# Patient Record
Sex: FEMALE | Race: WHITE | NOT HISPANIC OR LATINO | Employment: FULL TIME | URBAN - METROPOLITAN AREA
[De-identification: names, ages, dates, MRNs, and addresses within clinical notes are randomized per-mention and may not be internally consistent; named-entity substitution may affect disease eponyms.]

---

## 2017-05-30 ENCOUNTER — OFFICE VISIT (OUTPATIENT)
Dept: MEDICAL GROUP | Facility: PHYSICIAN GROUP | Age: 67
End: 2017-05-30
Payer: COMMERCIAL

## 2017-05-30 VITALS
WEIGHT: 154.1 LBS | TEMPERATURE: 99.1 F | HEIGHT: 65 IN | DIASTOLIC BLOOD PRESSURE: 70 MMHG | HEART RATE: 74 BPM | SYSTOLIC BLOOD PRESSURE: 118 MMHG | OXYGEN SATURATION: 94 % | RESPIRATION RATE: 16 BRPM | BODY MASS INDEX: 25.67 KG/M2

## 2017-05-30 DIAGNOSIS — E66.3 OVERWEIGHT (BMI 25.0-29.9): ICD-10-CM

## 2017-05-30 DIAGNOSIS — J32.4 CHRONIC PANSINUSITIS: ICD-10-CM

## 2017-05-30 DIAGNOSIS — N94.10 DYSPAREUNIA IN FEMALE: ICD-10-CM

## 2017-05-30 PROCEDURE — 99215 OFFICE O/P EST HI 40 MIN: CPT | Performed by: FAMILY MEDICINE

## 2017-05-30 RX ORDER — AMOXICILLIN AND CLAVULANATE POTASSIUM 875; 125 MG/1; MG/1
1 TABLET, FILM COATED ORAL 2 TIMES DAILY
Qty: 20 TAB | Refills: 0 | Status: SHIPPED | OUTPATIENT
Start: 2017-05-30 | End: 2017-07-31

## 2017-05-30 ASSESSMENT — PATIENT HEALTH QUESTIONNAIRE - PHQ9: CLINICAL INTERPRETATION OF PHQ2 SCORE: 2

## 2017-05-30 NOTE — ASSESSMENT & PLAN NOTE
Previously was on estradiol after her hysterectomy. She had an abnormal mammogram and it was recommended that she stop HRT. She does have pain with intercourse and low libido.

## 2017-05-30 NOTE — PATIENT INSTRUCTIONS

## 2017-05-30 NOTE — PROGRESS NOTES
"Shira Drake is a 66 y.o. female here to establish care and discuss weight concerns and sinus problems.    HPI:  Shira is a pleasant 66 of the muscular establish care. Her previous PCP was at [facility]. 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    Dyspareunia in female  Previously was on estradiol after her hysterectomy. She had an abnormal mammogram and it was recommended that she stop HRT. She does have pain with intercourse and low libido.    Overweight (BMI 25.0-29.9)  We discussed patient's weight today. BMI is 26. She tells me, \"I'm upset about my weight.\" She mentions this is the highest she has been.  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  Exercise: Seeing a  at her local gym. She does feel like the altitude is affecting her. She enjoys doing step aerobics, but hasn't been able to find any local classes.    Chronic pansinusitis  Patient has had sinus pressure and pain, blocked ears, lots of drainage for the last 8 months. She has been treated with azithromycin which has helped, but her symptoms return. No fever or chills.    Current medicines (including changes today)  Current Outpatient Prescriptions   Medication Sig Dispense Refill   • amoxicillin-clavulanate (AUGMENTIN) 875-125 MG Tab Take 1 Tab by mouth 2 times a day. 20 Tab 0     No current facility-administered medications for this visit.     She  has no past medical history on file.  She  has past surgical history that includes abdominal hysterectomy total; primary c section; and lumpectomy.  Social History   Substance Use Topics   • Smoking status: Never Smoker    • Smokeless tobacco: Never Used   • Alcohol Use: Yes      Comment: Social     Social History     Social History Narrative   • No narrative on file     Family History   Problem Relation Age of Onset   • Heart Disease Mother      Afib, CHF   • Hypertension Mother    • Cancer Father      Lung   • Hypertension Father    • Cancer Maternal Grandmother      " "Colon cancer   • Cancer Maternal Grandfather    • Alcohol/Drug Maternal Grandfather    • Cancer Paternal Grandmother      Breast cancer   • Heart Disease Paternal Grandfather    • No Known Problems Daughter    • No Known Problems Daughter    • No Known Problems Son    • No Known Problems Son      Family Status   Relation Status Death Age   • Mother     • Father     • Maternal Grandmother     • Maternal Grandfather     • Paternal Grandmother     • Paternal Grandfather     • Daughter Alive    • Daughter Alive    • Son Alive    • Son Alive      ROS  Constitutional: Negative for fever, chills and malaise/fatigue.   HENT: See HPI.   Eyes: Negative for pain.   Respiratory: Negative for cough and shortness of breath.    Cardiovascular: Negative for leg swelling.   Gastrointestinal: Negative for nausea, vomiting, abdominal pain and diarrhea.   Genitourinary: Negative for dysuria and hematuria.   Skin: Negative for rash.   Neurological: Negative for dizziness, focal weakness and headaches.   Endo/Heme/Allergies: Does not bruise/bleed easily.   Psychiatric/Behavioral: Negative for depression.  The patient is not nervous/anxious.       Objective:     Physical Exam:  Blood pressure 118/70, pulse 74, temperature 37.3 °C (99.1 °F), resp. rate 16, height 1.638 m (5' 4.5\"), weight 69.9 kg (154 lb 1.6 oz), SpO2 94 %. Body mass index is 26.05 kg/(m^2).  Constitutional: Alert, no distress.  Skin: Warm, dry, good turgor, no rashes in visible areas.  Eye: Equal, round and reactive, conjunctiva clear, lids normal.  ENMT: Sinuses non-tender. TM's clear bilaterally, lips without lesions, good dentition, oropharynx clear.  Neck: Trachea midline, no masses, no thyromegaly. No cervical or supraclavicular lymphadenopathy.  Respiratory: Unlabored respiratory effort, lungs clear to auscultation, no wheezes, no ronchi.  Cardiovascular: Normal S1, S2, no murmur, no edema.  Abdomen: Soft, non-tender, " no masses, no hepatosplenomegaly.  Psych: Alert and oriented x3, normal affect and mood.    Assessment and Plan:     1. Dyspareunia in female  This is a new problem for the patient. She was on hormone replacement therapy in the past, but this was discontinued after she had some abnormal mammograms and also for concern of cancer. She did not have any improvement with vaginal estrogen cream and does not want to retry this today. Discussed the use of oil based lubricants prior to intercourse. We'll continue to monitor.    2. Chronic pansinusitis  This is a recurring issue for the patient. Discussed looking at the sinuses with a CT scan versus trial of a higher dose antibiotic. Patient would like to try another course of antibiotics at this time. Will follow up in 6-8 weeks for reassessment. If not improved, will order CT scan.  - amoxicillin-clavulanate (AUGMENTIN) 875-125 MG Tab; Take 1 Tab by mouth 2 times a day.  Dispense: 20 Tab; Refill: 0    3. Overweight (BMI 25.0-29.9)  Patient's weight was discussed today, including healthy low-carb diet, 30-minutes of moderate exercise daily and avoiding sugars and high-fat foods. Query psychosocial component and mild depression. Continue to monitor.    Total of 50 minutes face-to-face time spent with patient, with greater than 50% of the total time discussing patient's issues and symptoms as listed above in assessment and plan, as well as managing coordination of care for future evaluation and treatment.    Records requested from previous PCP.  Followup: Return in about 2 months (around 7/30/2017) for f/u sinus and weight concerns, short.         PLEASE NOTE: This dictation was created using voice recognition software. I have made every reasonable attempt to correct obvious errors, but I expect that there are errors of grammar and possibly content that I did not discover before finalizing the note.

## 2017-05-30 NOTE — MR AVS SNAPSHOT
"        Shira LI Vidal   2017 1:00 PM   Office Visit   MRN: [id number]    Department:  [facility]   Dept Phone:  [phone]    Description:  Female : 1950   Provider:  Soraya Alvarado M.D.           Reason for Visit     Nutrition Counseling weight gain    Sinus Problem sinus pain, blocked ears, drainage, [id number]       Allergies as of 2017     Allergen Noted Reactions    Ciprofloxacin Hcl 2016   Rash    rash      You were diagnosed with     Dyspareunia in female   [4703779]       Chronic pansinusitis   [723483]       Overweight (BMI 25.0-29.9)   [630369]         Vital Signs     Blood Pressure Pulse Temperature Respirations Height Weight    118/70 mmHg 74 37.3 °C (99.1 °F) 16 1.638 m (5' 4.5\") 69.9 kg (154 lb 1.6 oz)    Body Mass Index Oxygen Saturation Smoking Status             26.05 kg/m2 94% Never Smoker          Basic Information     Date Of Birth Sex Race Ethnicity Preferred Language    1950 Female White Non- English      Your appointments     2017  1:00 PM   Established Patient with Soraya Alvarado M.D.   [facility] (--)    [address]  [address]  [place] [address] [phone]           You will be receiving a confirmation call a few days before your appointment from our automated call confirmation system.              Problem List              ICD-10-CM Priority Class Noted - Resolved    Dyspareunia in female N94.10   2017 - Present    Overweight (BMI 25.0-29.9) E66.3   2017 - Present    Chronic pansinusitis J32.4   2017 - Present      Health Maintenance        Date Due Completion Dates    IMM DTaP/Tdap/Td Vaccine (1 - Tdap) 6/3/1971 ---    MAMMOGRAM 6/3/1992 ---    COLONOSCOPY 6/3/2002 ---    IMM ZOSTER VACCINE 6/3/2012 ---            Current Immunizations     13-VALENT PCV PREVNAR 2016    Influenza TIV (IM) 2016, 10/7/2015      Below and/or attached are the medications your provider expects you to take. Review all " of your home medications and newly ordered medications with your provider and/or pharmacist. Follow medication instructions as directed by your provider and/or pharmacist. Please keep your medication list with you and share with your provider. Update the information when medications are discontinued, doses are changed, or new medications (including over-the-counter products) are added; and carry medication information at all times in the event of emergency situations     Allergies:  CIPROFLOXACIN HCL - Rash               Medications  Valid as of: May 30, 2017 -  1:43 PM    Generic Name Brand Name Tablet Size Instructions for use    Amoxicillin-Pot Clavulanate (Tab) AUGMENTIN 875-125 MG Take 1 Tab by mouth 2 times a day.        .                 Medicines prescribed today were sent to:     [facility][id number] - [place] - [address]    [address] [place] [address]    Phone: [phone] Fax: [phone]    Open 24 Hours?: No      Medication refill instructions:       If your prescription bottle indicates you have medication refills left, it is not necessary to call your provider’s office. Please contact your pharmacy and they will refill your medication.    If your prescription bottle indicates you do not have any refills left, you may request refills at any time through one of the following ways: The online [employer] system (except Urgent Care), by calling your provider’s office, or by asking your pharmacy to contact your provider’s office with a refill request. Medication refills are processed only during regular business hours and may not be available until the next business day. Your provider may request additional information or to have a follow-up visit with you prior to refilling your medication.   *Please Note: Medication refills are assigned a new Rx number when refilled electronically. Your pharmacy may indicate that no refills were authorized even though a new prescription for the same medication is  available at the pharmacy. Please request the medicine by name with the pharmacy before contacting your provider for a refill.        Instructions    Sinusitis, Adult  Sinusitis is redness, soreness, and inflammation of the paranasal sinuses. Paranasal sinuses are air pockets within the bones of your face. They are located beneath your eyes, in the middle of your forehead, and above your eyes. In healthy paranasal sinuses, mucus is able to drain out, and air is able to circulate through them by way of your nose. However, when your paranasal sinuses are inflamed, mucus and air can become trapped. This can allow bacteria and other germs to grow and cause infection.  Sinusitis can develop quickly and last only a short time (acute) or continue over a long period (chronic). Sinusitis that lasts for more than 12 weeks is considered chronic.  CAUSES  Causes of sinusitis include:  · Allergies.  · Structural abnormalities, such as displacement of the cartilage that separates your nostrils (deviated septum), which can decrease the air flow through your nose and sinuses and affect sinus drainage.  · Functional abnormalities, such as when the small hairs (cilia) that line your sinuses and help remove mucus do not work properly or are not present.  SIGNS AND SYMPTOMS  Symptoms of acute and chronic sinusitis are the same. The primary symptoms are pain and pressure around the affected sinuses. Other symptoms include:  · Upper toothache.  · Earache.  · Headache.  · Bad breath.  · Decreased sense of smell and taste.  · A cough, which worsens when you are lying flat.  · Fatigue.  · Fever.  · Thick drainage from your nose, which often is green and may contain pus (purulent).  · Swelling and warmth over the affected sinuses.  DIAGNOSIS  Your health care provider will perform a physical exam. During your exam, your health care provider may perform any of the following to help determine if you have acute sinusitis or chronic  sinusitis:  · Look in your nose for signs of abnormal growths in your nostrils (nasal polyps).  · Tap over the affected sinus to check for signs of infection.  · View the inside of your sinuses using an imaging device that has a light attached (endoscope).  If your health care provider suspects that you have chronic sinusitis, one or more of the following tests may be recommended:  · Allergy tests.  · Nasal culture. A sample of mucus is taken from your nose, sent to a lab, and screened for bacteria.  · Nasal cytology. A sample of mucus is taken from your nose and examined by your health care provider to determine if your sinusitis is related to an allergy.  TREATMENT  Most cases of acute sinusitis are related to a viral infection and will resolve on their own within 10 days. Sometimes, medicines are prescribed to help relieve symptoms of both acute and chronic sinusitis. These may include pain medicines, decongestants, nasal steroid sprays, or saline sprays.  However, for sinusitis related to a bacterial infection, your health care provider will prescribe antibiotic medicines. These are medicines that will help kill the bacteria causing the infection.  Rarely, sinusitis is caused by a fungal infection. In these cases, your health care provider will prescribe antifungal medicine.  For some cases of chronic sinusitis, surgery is needed. Generally, these are cases in which sinusitis recurs more than 3 times per year, despite other treatments.  HOME CARE INSTRUCTIONS  · Drink plenty of water. Water helps thin the mucus so your sinuses can drain more easily.  · Use a humidifier.  · Inhale steam 3-4 times a day (for example, sit in the bathroom with the shower running).  · Apply a warm, moist washcloth to your face 3-4 times a day, or as directed by your health care provider.  · Use saline nasal sprays to help moisten and clean your sinuses.  · Take medicines only as directed by your health care provider.  · If you were  prescribed either an antibiotic or antifungal medicine, finish it all even if you start to feel better.  SEEK IMMEDIATE MEDICAL CARE IF:  · You have increasing pain or severe headaches.  · You have nausea, vomiting, or drowsiness.  · You have swelling around your face.  · You have vision problems.  · You have a stiff neck.  · You have difficulty breathing.     This information is not intended to replace advice given to you by your health care provider. Make sure you discuss any questions you have with your health care provider.     Document Released: 12/18/2006 Document Revised: 01/08/2016 Document Reviewed: 01/01/2013  Let it [employer] Interactive Patient Education ©2016 Elsevier Inc.            AfterYeshart Access Code: Activation code not generated  Current H[employer]Art Status: Active

## 2017-05-30 NOTE — Clinical Note
[facility]  Soraya Alvarado M.D.  [address]  [place] [address]  Fax: [phone]   Authorization for Release/Disclosure of   Protected Health Information   Name: SHIRA DRAKE : 1950 SSN: [id number]   Address: [address] [place] [address]  [place] [address] Phone:    [phone] (home)    I authorize the entity listed below to release/disclose the PHI below to:   [facility]/Soraya Alvarado M.D. and Soraya Alvarado M.D.   Provider or Entity Name:  [facility], [place], [place]  Phone:      Fax:     Reason for request: continuity of care   Information to be released:    [  ] LAST COLONOSCOPY,  including any PATH REPORT and follow-up  [  ] LAST FIT/COLOGUARD RESULT [  ] LAST DEXA  [  ] LAST MAMMOGRAM  [  ] LAST PAP  [  ] LAST LABS [  ] RETINA EXAM REPORT  [  ] IMMUNIZATION RECORDS  [  ] Release all info      [  ] Check here and initial the line next to each item to release ALL health information INCLUDING  _____ Care and treatment for drug and / or alcohol abuse  _____ HIV testing, infection status, or AIDS  _____ Genetic Testing    DATES OF SERVICE OR TIME PERIOD TO BE DISCLOSED: _____________  I understand and acknowledge that:  * This Authorization may be revoked at any time by you in writing, except if your health information has already been used or disclosed.  * Your health information that will be used or disclosed as a result of you signing this authorization could be re-disclosed by the recipient. If this occurs, your re-disclosed health information may no longer be protected by State or Federal laws.  * You may refuse to sign this Authorization. Your refusal will not affect your ability to obtain treatment.  * This Authorization becomes effective upon signing and will  on (date) __________.      If no date is indicated, this Authorization will  one (1) year from the signature date.    Name: Shira Drake    Signature:   Date:     2017       PLEASE FAX  REQUESTED RECORDS BACK TO: [phone]

## 2017-05-30 NOTE — ASSESSMENT & PLAN NOTE
"We discussed patient's weight today. BMI is 26. She tells me, \"I'm upset about my weight.\" She mentions this is the highest she has been.  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  Exercise: Seeing a  at her local gym. She does feel like the altitude is affecting her. She enjoys doing step aerobics, but hasn't been able to find any local classes.  "

## 2017-07-31 ENCOUNTER — TELEPHONE (OUTPATIENT)
Dept: MEDICAL GROUP | Facility: PHYSICIAN GROUP | Age: 67
End: 2017-07-31

## 2017-07-31 ENCOUNTER — OFFICE VISIT (OUTPATIENT)
Dept: MEDICAL GROUP | Facility: PHYSICIAN GROUP | Age: 67
End: 2017-07-31
Payer: COMMERCIAL

## 2017-07-31 VITALS
OXYGEN SATURATION: 94 % | BODY MASS INDEX: 26.27 KG/M2 | SYSTOLIC BLOOD PRESSURE: 108 MMHG | DIASTOLIC BLOOD PRESSURE: 78 MMHG | WEIGHT: 153.88 LBS | RESPIRATION RATE: 16 BRPM | HEART RATE: 72 BPM | HEIGHT: 64 IN | TEMPERATURE: 99 F

## 2017-07-31 DIAGNOSIS — Z13.220 SCREENING FOR LIPID DISORDERS: ICD-10-CM

## 2017-07-31 DIAGNOSIS — L60.8 NAIL DEFORMITY: ICD-10-CM

## 2017-07-31 DIAGNOSIS — E66.3 OVERWEIGHT (BMI 25.0-29.9): ICD-10-CM

## 2017-07-31 DIAGNOSIS — N94.10 DYSPAREUNIA IN FEMALE: ICD-10-CM

## 2017-07-31 DIAGNOSIS — R21 RASH: ICD-10-CM

## 2017-07-31 DIAGNOSIS — J32.4 CHRONIC PANSINUSITIS: ICD-10-CM

## 2017-07-31 DIAGNOSIS — Z13.1 SCREENING FOR DIABETES MELLITUS: ICD-10-CM

## 2017-07-31 DIAGNOSIS — Z78.0 POSTMENOPAUSAL: ICD-10-CM

## 2017-07-31 PROBLEM — B35.1 ONYCHOMYCOSIS: Status: ACTIVE | Noted: 2017-07-31

## 2017-07-31 PROCEDURE — 99214 OFFICE O/P EST MOD 30 MIN: CPT | Performed by: FAMILY MEDICINE

## 2017-07-31 NOTE — ASSESSMENT & PLAN NOTE
This is a new problem. She has noticed that her nail is  from the nail bed. Patient was told by her manicurist that she had fungus on her nails. She has her nails done every 2-3 weeks with gel polish.

## 2017-07-31 NOTE — Clinical Note
[facility]  Soraya Alvarado M.D.  [address]  [place] [address]  Fax: [phone]   Authorization for Release/Disclosure of   Protected Health Information   Name: SHIRA LI DRAKE : 1950 SSN: [id number]   Address: [address] [place] [address]  [place] [address] Phone:    [phone] (home)    I authorize the entity listed below to release/disclose the PHI below to:   [facility]/Soraya Alvarado M.D. and Soraya Alvarado M.D.   Provider or Entity Name:  [facility], [place], [place]  Phone:      Fax:  [phone]   Reason for request: continuity of care   Information to be released:    [  ] LAST COLONOSCOPY,  including any PATH REPORT and follow-up  [  ] LAST FIT/COLOGUARD RESULT [  ] LAST DEXA  [  ] LAST MAMMOGRAM  [  ] LAST PAP  [  ] LAST LABS [  ] RETINA EXAM REPORT  [X  ] IMMUNIZATION RECORDS  [X  ] Release all info      [  ] Check here and initial the line next to each item to release ALL health information INCLUDING  _____ Care and treatment for drug and / or alcohol abuse  _____ HIV testing, infection status, or AIDS  _____ Genetic Testing    DATES OF SERVICE OR TIME PERIOD TO BE DISCLOSED: _____________  I understand and acknowledge that:  * This Authorization may be revoked at any time by you in writing, except if your health information has already been used or disclosed.  * Your health information that will be used or disclosed as a result of you signing this authorization could be re-disclosed by the recipient. If this occurs, your re-disclosed health information may no longer be protected by State or Federal laws.  * You may refuse to sign this Authorization. Your refusal will not affect your ability to obtain treatment.  * This Authorization becomes effective upon signing and will  on (date) __________.      If no date is indicated, this Authorization will  one (1) year from the signature date.    Name: Shira Drake    Signature:   Date:     2017       PLEASE  FAX REQUESTED RECORDS BACK TO: [phone]

## 2017-07-31 NOTE — ASSESSMENT & PLAN NOTE
This is a new problem. Starting last year, she noticed a rash on the backs of her lower legs. It has seemed to spread to the front of her legs. It is not itchy. No drainage.     Worsens when she wears long pants on a hot day.

## 2017-07-31 NOTE — TELEPHONE ENCOUNTER
Patient is requesting additional labs to be ordered.  States she would like complete panel of labs as she had not had these done since she was at [facility].  To  to advise.  Patient aware she will get a [employer] message once this has been done.

## 2017-07-31 NOTE — PROGRESS NOTES
"Subjective:   Shira Drake is a 67 y.o. female here today for follow-up sinus infection, new rash and nail concerns.  Chronic pansinusitis  Since her last appointment, patient reports that her sinus pressure, pain and nasal congestion symptoms have not changed. Now associated with headaches.    She was treated with antibiotics for sinus infections in December 2016 and again in May 2017.    Continues to use Flonase without improvement. We discussed getting a CT scan, but patient is hesitant due to cost.    Rash  This is a new problem. Starting last year, she noticed a rash on the backs of her lower legs. It has seemed to spread to the front of her legs. It is not itchy. No drainage.     Worsens when she wears long pants on a hot day.    Nail deformity  This is a new problem. She has noticed that her nail is  from the nail bed. Patient was told by her manicurist that she had fungus on her nails. She has her nails done every 2-3 weeks with gel polish.    Dyspareunia in female  No change. We have not yet received records from her previous physician regarding her abnormal mammogram.     Current medicines (including changes today)  No current outpatient prescriptions on file.     No current facility-administered medications for this visit.     She  has no past medical history on file.    ROS   See HPI. No chest pain, no shortness of breath, no abdominal pain.     Objective:     Physical Exam:  Blood pressure 108/78, pulse 72, temperature 37.2 °C (99 °F), resp. rate 16, height 1.638 m (5' 4.49\"), weight 69.8 kg (153 lb 14.1 oz), SpO2 94 %, not currently breastfeeding. Body mass index is 26.02 kg/(m^2).   Constitutional: Alert, no distress.  Skin: Warm, dry, good turgor, no rashes in visible areas. Nails with thick red gel polish. No gross deformity seen. Unable to examine nail plate or bed. Lower extremities with hyperpigmented macular rash up to mid shin.  Eye: Equal, round and reactive, conjunctiva clear, " lids normal.  ENMT: TM's clear bilaterally, lips without lesions, good dentition, oropharynx clear.  Neck: Trachea midline, no masses, no thyromegaly.  Respiratory: Unlabored respiratory effort, no cough.  Psych: Alert and oriented x3, normal affect and mood.    Assessment and Plan:     1. Chronic pansinusitis  No improvement with antibiotics. CT ordered to further evaluate.  - CT-MAXILLOFACIAL W/O; Future    2. Rash  No signs of skin cancer or infection. Possible sun damage. Patient referred to dermatology per her request.  - REFERRAL TO DERMATOLOGY    3. Nail deformity  Unable to perform a thorough examination today as patient had gel polish on all of her fingernails. No clear deformity or infection seen. Placed orders for blood work to look for thyroid disease and vitamin deficiencies. Also placed an order for a fungal culture of her nail to be dropped off after she has her nail polish removed. We'll continue to monitor.  - FUNGAL CULTURE-SKIN/HAIR; Future  - TSH; Future  - VITAMIN D,25 HYDROXY; Future  - IRON; Future  - VITAMIN B12; Future    4. Dyspareunia in female  Chronic and stable. Awaiting records regarding discontinuation of hormone replacement therapy.    5. Overweight (BMI 25.0-29.9)  Patient's weight was discussed today, including healthy low-carb diet, 30-minutes of moderate exercise daily and avoiding sugars and high-fat foods.    6. Postmenopausal  DEXA ordered.  - DS-BONE DENSITY STUDY (DEXA); Future    Followup: Return in about 3 months (around 10/31/2017) for f/u nail and skin issues, short.         PLEASE NOTE: This dictation was created using voice recognition software. I have made every reasonable attempt to correct obvious errors, but I expect that there are errors of grammar and possibly content that I did not discover before finalizing the note.

## 2017-07-31 NOTE — MR AVS SNAPSHOT
"        Shira Drake   2017 1:00 PM   Office Visit   MRN: [id number]    Department:  [facility]   Dept Phone:  [phone]    Description:  Female : 1950   Provider:  Soraya Alvarado M.D.           Reason for Visit     Sinusitis     Rash both legs    Weight Loss discuss weight loss    Nail Problem nail fungus     Referral Needed BMD       Allergies as of 2017     Allergen Noted Reactions    Ciprofloxacin Hcl 2016   Rash    rash      You were diagnosed with     Chronic pansinusitis   [026666]       Rash   [577219]       Nail deformity   [394401]       Dyspareunia in female   [5743503]       Overweight (BMI 25.0-29.9)   [825459]       Postmenopausal   [371478]         Vital Signs     Blood Pressure Pulse Temperature Respirations Height Weight    108/78 mmHg 72 37.2 °C (99 °F) 16 1.638 m (5' 4.49\") 69.8 kg (153 lb 14.1 oz)    Body Mass Index Oxygen Saturation Breastfeeding? Smoking Status          26.02 kg/m2 94% No Never Smoker         Basic Information     Date Of Birth Sex Race Ethnicity Preferred Language    1950 Female White Non- English      Your appointments     2017  9:00 AM   Established Patient with Soraya Alvarado M.D.   [facility] (--)    [address]  [address]  [facility] [address] [phone]           You will be receiving a confirmation call a few days before your appointment from our automated call confirmation system.              Problem List              ICD-10-CM Priority Class Noted - Resolved    Dyspareunia in female N94.10   2017 - Present    Overweight (BMI 25.0-29.9) E66.3   2017 - Present    Chronic pansinusitis J32.4   2017 - Present    Rash R21   2017 - Present    Nail deformity L60.8   2017 - Present      Health Maintenance        Date Due Completion Dates    IMM DTaP/Tdap/Td Vaccine (1 - Tdap) 6/3/1971 ---    COLONOSCOPY 6/3/2002 ---    IMM ZOSTER VACCINE 6/3/2012 ---    BONE DENSITY 6/3/2017 ---   "    IMM PNEUMOCOCCAL 65+ (ADULT) LOW/MEDIUM RISK SERIES (2 of 2 - PPSV23) 6/3/2017 1/26/2016    IMM INFLUENZA (1) 9/1/2017 9/24/2016, 10/7/2015    MAMMOGRAM 11/10/2017 11/10/2016            Current Immunizations     13-VALENT PCV PREVNAR 1/26/2016    Influenza TIV (IM) 9/24/2016, 10/7/2015      Below and/or attached are the medications your provider expects you to take. Review all of your home medications and newly ordered medications with your provider and/or pharmacist. Follow medication instructions as directed by your provider and/or pharmacist. Please keep your medication list with you and share with your provider. Update the information when medications are discontinued, doses are changed, or new medications (including over-the-counter products) are added; and carry medication information at all times in the event of emergency situations     Allergies:  CIPROFLOXACIN HCL - Rash               Medications  Valid as of: July 31, 2017 -  1:38 PM    Generic Name Brand Name Tablet Size Instructions for use    .                 Medicines prescribed today were sent to:     [facility] #[id number] - [place] - [address] [place] [address]    Phone: [phone] Fax: [phone]    Open 24 Hours?: No      Medication refill instructions:       If your prescription bottle indicates you have medication refills left, it is not necessary to call your provider’s office. Please contact your pharmacy and they will refill your medication.    If your prescription bottle indicates you do not have any refills left, you may request refills at any time through one of the following ways: The online [employer] system (except Urgent Care), by calling your provider’s office, or by asking your pharmacy to contact your provider’s office with a refill request. Medication refills are processed only during regular business hours and may not be available until the next business day. Your provider may request additional information or to  have a follow-up visit with you prior to refilling your medication.   *Please Note: Medication refills are assigned a new Rx number when refilled electronically. Your pharmacy may indicate that no refills were authorized even though a new prescription for the same medication is available at the pharmacy. Please request the medicine by name with the pharmacy before contacting your provider for a refill.        Your To Do List     Future Labs/Procedures Complete By Expires    CT-MAXILLOFACIAL W/O  As directed 7/31/2018    DS-BONE DENSITY STUDY (DEXA)  As directed 1/31/2018    FUNGAL CULTURE-SKIN/HAIR  As directed 7/31/2018    IRON  As directed 7/31/2018    TSH  As directed 8/1/2018    VITAMIN B12  As directed 7/31/2018    VITAMIN D,25 HYDROXY  As directed 8/1/2018      Referral     A referral request has been sent to our patient care coordination department. Please allow 3-5 business days for us to process this request and contact you either by phone or mail. If you do not hear from us by the 5th business day, please call us at [phone].           [employer] Access Code: Activation code not generated  Current [employer] Status: Active

## 2017-07-31 NOTE — ASSESSMENT & PLAN NOTE
No change. We have not yet received records from her previous physician regarding her abnormal mammogram.

## 2017-07-31 NOTE — ASSESSMENT & PLAN NOTE
Since her last appointment, patient reports that her sinus pressure, pain and nasal congestion symptoms have not changed. Now associated with headaches.    She was treated with antibiotics for sinus infections in December 2016 and again in May 2017.    Continues to use Flonase without improvement. We discussed getting a CT scan, but patient is hesitant due to cost.

## 2017-08-02 ENCOUNTER — HOSPITAL ENCOUNTER (OUTPATIENT)
Dept: LAB | Facility: MEDICAL CENTER | Age: 67
End: 2017-08-02
Attending: FAMILY MEDICINE
Payer: COMMERCIAL

## 2017-08-02 DIAGNOSIS — L60.8 NAIL DEFORMITY: ICD-10-CM

## 2017-08-02 LAB
25(OH)D3 SERPL-MCNC: 32 NG/ML (ref 30–100)
IRON SERPL-MCNC: 121 UG/DL (ref 40–170)
TSH SERPL DL<=0.005 MIU/L-ACNC: 2.94 UIU/ML (ref 0.3–3.7)
VIT B12 SERPL-MCNC: 745 PG/ML (ref 211–911)

## 2017-08-02 PROCEDURE — 84443 ASSAY THYROID STIM HORMONE: CPT

## 2017-08-02 PROCEDURE — 82607 VITAMIN B-12: CPT

## 2017-08-02 PROCEDURE — 82306 VITAMIN D 25 HYDROXY: CPT

## 2017-08-02 PROCEDURE — 83540 ASSAY OF IRON: CPT

## 2017-08-02 PROCEDURE — 36415 COLL VENOUS BLD VENIPUNCTURE: CPT

## 2017-11-09 ENCOUNTER — HOSPITAL ENCOUNTER (OUTPATIENT)
Dept: RADIOLOGY | Facility: MEDICAL CENTER | Age: 67
End: 2017-11-09
Attending: FAMILY MEDICINE
Payer: COMMERCIAL

## 2017-11-09 DIAGNOSIS — Z78.0 POSTMENOPAUSAL: ICD-10-CM

## 2017-11-09 DIAGNOSIS — J32.4 CHRONIC PANSINUSITIS: ICD-10-CM

## 2017-11-09 PROCEDURE — 70486 CT MAXILLOFACIAL W/O DYE: CPT

## 2017-11-09 PROCEDURE — 77080 DXA BONE DENSITY AXIAL: CPT

## 2017-12-18 ENCOUNTER — OFFICE VISIT (OUTPATIENT)
Dept: URGENT CARE | Facility: CLINIC | Age: 67
End: 2017-12-18
Payer: COMMERCIAL

## 2017-12-18 VITALS
TEMPERATURE: 97.7 F | WEIGHT: 148 LBS | HEART RATE: 81 BPM | SYSTOLIC BLOOD PRESSURE: 120 MMHG | OXYGEN SATURATION: 95 % | HEIGHT: 64 IN | BODY MASS INDEX: 25.27 KG/M2 | DIASTOLIC BLOOD PRESSURE: 70 MMHG

## 2017-12-18 DIAGNOSIS — R05.9 COUGH: ICD-10-CM

## 2017-12-18 DIAGNOSIS — J06.9 UPPER RESPIRATORY TRACT INFECTION, UNSPECIFIED TYPE: ICD-10-CM

## 2017-12-18 DIAGNOSIS — H61.23 BILATERAL IMPACTED CERUMEN: ICD-10-CM

## 2017-12-18 PROCEDURE — 99214 OFFICE O/P EST MOD 30 MIN: CPT | Performed by: PHYSICIAN ASSISTANT

## 2017-12-18 RX ORDER — AZITHROMYCIN 250 MG/1
TABLET, FILM COATED ORAL
Qty: 6 TAB | Refills: 0 | Status: SHIPPED | OUTPATIENT
Start: 2017-12-18

## 2017-12-18 RX ORDER — PROMETHAZINE HYDROCHLORIDE AND CODEINE PHOSPHATE 6.25; 1 MG/5ML; MG/5ML
5 SYRUP ORAL EVERY 12 HOURS PRN
Qty: 120 ML | Refills: 0 | Status: SHIPPED | OUTPATIENT
Start: 2017-12-18 | End: 2017-12-25

## 2017-12-18 ASSESSMENT — ENCOUNTER SYMPTOMS
WHEEZING: 0
SPUTUM PRODUCTION: 1
EYE DISCHARGE: 0
MYALGIAS: 1
EYE REDNESS: 0
SINUS PAIN: 0
ABDOMINAL PAIN: 0
DIARRHEA: 0
SORE THROAT: 1
CHILLS: 0
HEADACHES: 1
FEVER: 0
NAUSEA: 0
SHORTNESS OF BREATH: 0
COUGH: 1
VOMITING: 0

## 2017-12-18 NOTE — PROGRESS NOTES
"Subjective:   Shira Drake is a 67 y.o. female who presents for Cough (sinus pain,head cold x1week )        Cough   This is a new problem. The current episode started in the past 7 days. The problem has been waxing and waning. The cough is productive of sputum. Associated symptoms include ear congestion, headaches, myalgias, nasal congestion and a sore throat. Pertinent negatives include no chills, ear pain, eye redness, fever, rash, shortness of breath or wheezing. She has tried nothing for the symptoms. Her past medical history is significant for bronchitis. There is no history of asthma, environmental allergies or pneumonia.   Notes about one week of sinus congestion and prod cough, PCP is Dr Alvarado, PMH of sinus cogestion, had CT scan of sinus, denies fever/chills, some chills, denies sorethroat/ear pain, c/o ear pressure, c/o HA, runny nose, denies nausea/vomiting/abdpain/diarrhea/rash, denies pMH of asthma, cough prod, denies wheeze, PMH of bronchitis, denies pMH of pneumonia, denies seasonal allerg. Nyquil/sudafed/ibu. Cough wakes from sleep.        Review of Systems   Constitutional: Positive for malaise/fatigue. Negative for chills and fever.   HENT: Positive for congestion and sore throat. Negative for ear discharge, ear pain, hearing loss, sinus pain and tinnitus.    Eyes: Negative for discharge and redness.   Respiratory: Positive for cough and sputum production. Negative for shortness of breath and wheezing.    Gastrointestinal: Negative for abdominal pain, diarrhea, nausea and vomiting.   Musculoskeletal: Positive for myalgias.   Skin: Negative for rash.   Neurological: Positive for headaches.   Endo/Heme/Allergies: Negative for environmental allergies.     Allergies   Allergen Reactions   • Ciprofloxacin Hcl Rash     rash   I have worn a mask for the entire encounter with this patient.      Objective:   /70   Pulse 81   Temp 36.5 °C (97.7 °F)   Ht 1.626 m (5' 4\")   Wt 67.1 kg (148 lb)   " SpO2 95%   BMI 25.40 kg/m²   Physical Exam   Constitutional: She is oriented to person, place, and time. She appears well-developed and well-nourished. No distress.   HENT:   Head: Normocephalic and atraumatic.   Right Ear: Tympanic membrane and external ear normal. Tympanic membrane is not erythematous.   Left Ear: Tympanic membrane and external ear normal. Tympanic membrane is not erythematous.   Nose: Right sinus exhibits maxillary sinus tenderness. Right sinus exhibits no frontal sinus tenderness. Left sinus exhibits maxillary sinus tenderness. Left sinus exhibits no frontal sinus tenderness.   Mouth/Throat: Uvula is midline and mucous membranes are normal. Posterior oropharyngeal erythema ( mild PND) present. No oropharyngeal exudate, posterior oropharyngeal edema or tonsillar abscesses.   Mild to moderate disorganized cerumen occluding bilat EAC   Eyes: Conjunctivae and lids are normal. Right eye exhibits no discharge. Left eye exhibits no discharge. No scleral icterus.   Neck: Neck supple.   Pulmonary/Chest: Effort normal. No respiratory distress. She has no decreased breath sounds. She has no wheezes. She has no rhonchi. She has no rales.   Musculoskeletal: Normal range of motion.   Lymphadenopathy:     She has cervical adenopathy ( mild bilat).   Neurological: She is alert and oriented to person, place, and time. She is not disoriented.   Skin: Skin is warm and dry. She is not diaphoretic. No erythema. No pallor.   Psychiatric: Her speech is normal and behavior is normal.   Nursing note and vitals reviewed.    Procedure: Cerumen Removal  Risks and benefits of procedure discussed  Cerumen removed with curette and lavage after softening agent instilled  Patient tolerated well  Post procedure exam with clear canal and normal TM        Assessment/Plan:   Assessment    1. Upper respiratory tract infection, unspecified type  Supportive care is reviewed with patient/caregiver - recommend to push PO fluids and  electrolytes, Nsaids/tylenol, netti pot/saline irrig, humidifier in home, flonase, ponaris, antihistamines    Cautioned regarding potential for sedation with medication.  Suspect viral URI, pt is travelling and would like Rx for zpack now, we discuss still in viral timeframe, Contingent antibiotic prescription given to patient to fill upon meeting criteria of guidelines discussed.   If filling,  take full course of Rx, take with probiotics, observe for resolution  Return to clinic with lack of resolution or progression of symptoms.    Techniques of self cerumen clearing discussed    2. Cough    - azithromycin (ZITHROMAX) 250 MG Tab; Take as directed on package. Dispense one package.  Dispense: 6 Tab; Refill: 0  - promethazine-codeine (PHENERGAN-CODEINE) 6.25-10 MG/5ML Syrup; Take 5 mL by mouth every 12 hours as needed for up to 7 days.  Dispense: 120 mL; Refill: 0    3. Bilateral impacted cerumen

## 2021-03-03 DIAGNOSIS — Z23 NEED FOR VACCINATION: ICD-10-CM

## 2022-11-28 NOTE — ASSESSMENT & PLAN NOTE
Patient has had sinus pressure and pain, blocked ears, lots of drainage for the last 8 months. She has been treated with azithromycin which has helped, but her symptoms return. No fever or chills.   [FreeTextEntry1] : sick [de-identified] : 2 days URI\par felt poorly yesterday\par head congestion\par left ear pain\par left face pain\par headaches\par no fevers\par no body aches\par 18 year old also currently sick, cough and sore throat\par covid 19 12/26/2020\par thyroid nodules followed by Dr Carter and Jaleesa